# Patient Record
Sex: FEMALE | Race: WHITE
[De-identification: names, ages, dates, MRNs, and addresses within clinical notes are randomized per-mention and may not be internally consistent; named-entity substitution may affect disease eponyms.]

---

## 2017-11-17 ENCOUNTER — HOSPITAL ENCOUNTER (INPATIENT)
Dept: HOSPITAL 27 - B3A | Age: 32
LOS: 50 days | Discharge: HOME | DRG: 750 | End: 2018-01-06
Attending: PSYCHIATRY & NEUROLOGY | Admitting: PSYCHIATRY & NEUROLOGY
Payer: MEDICAID

## 2017-11-17 VITALS — HEIGHT: 62 IN | WEIGHT: 155 LBS | BODY MASS INDEX: 28.52 KG/M2

## 2017-11-17 VITALS — DIASTOLIC BLOOD PRESSURE: 76 MMHG | SYSTOLIC BLOOD PRESSURE: 120 MMHG

## 2017-11-17 VITALS — DIASTOLIC BLOOD PRESSURE: 83 MMHG | SYSTOLIC BLOOD PRESSURE: 122 MMHG

## 2017-11-17 DIAGNOSIS — F41.9: ICD-10-CM

## 2017-11-17 DIAGNOSIS — Z81.8: ICD-10-CM

## 2017-11-17 DIAGNOSIS — Z91.19: ICD-10-CM

## 2017-11-17 DIAGNOSIS — Z59.0: ICD-10-CM

## 2017-11-17 DIAGNOSIS — F25.0: Primary | ICD-10-CM

## 2017-11-17 DIAGNOSIS — Z88.8: ICD-10-CM

## 2017-11-17 DIAGNOSIS — L30.9: ICD-10-CM

## 2017-11-17 DIAGNOSIS — R45.4: ICD-10-CM

## 2017-11-17 DIAGNOSIS — Z91.14: ICD-10-CM

## 2017-11-17 DIAGNOSIS — Z79.899: ICD-10-CM

## 2017-11-17 PROCEDURE — 99285 EMERGENCY DEPT VISIT HI MDM: CPT

## 2017-11-17 RX ADMIN — HYDROCORTISONE SCH APPL: 1 OINTMENT TOPICAL at 18:00

## 2017-11-17 SDOH — ECONOMIC STABILITY - HOUSING INSECURITY: HOMELESSNESS: Z59.0

## 2017-11-18 VITALS — SYSTOLIC BLOOD PRESSURE: 121 MMHG | DIASTOLIC BLOOD PRESSURE: 63 MMHG

## 2017-11-18 VITALS — DIASTOLIC BLOOD PRESSURE: 70 MMHG | SYSTOLIC BLOOD PRESSURE: 120 MMHG

## 2017-11-18 RX ADMIN — HYDROCORTISONE SCH APPL: 1 OINTMENT TOPICAL at 17:09

## 2017-11-18 RX ADMIN — HYDROCORTISONE SCH APPL: 1 OINTMENT TOPICAL at 09:47

## 2017-11-19 RX ADMIN — HYDROCORTISONE SCH APPL: 1 OINTMENT TOPICAL at 16:26

## 2017-11-19 RX ADMIN — DIVALPROEX SODIUM SCH MG: 500 TABLET, DELAYED RELEASE ORAL at 17:00

## 2017-11-19 RX ADMIN — HYDROCORTISONE SCH APPL: 1 OINTMENT TOPICAL at 08:54

## 2017-11-20 RX ADMIN — HYDROCORTISONE SCH APPL: 1 OINTMENT TOPICAL at 16:45

## 2017-11-20 RX ADMIN — DIVALPROEX SODIUM SCH MG: 500 TABLET, DELAYED RELEASE ORAL at 09:00

## 2017-11-20 RX ADMIN — HYDROCORTISONE SCH APPL: 1 OINTMENT TOPICAL at 09:00

## 2017-11-20 RX ADMIN — DIVALPROEX SODIUM SCH MG: 500 TABLET, DELAYED RELEASE ORAL at 17:00

## 2017-11-21 RX ADMIN — LITHIUM CARBONATE SCH MG: 300 CAPSULE ORAL at 17:00

## 2017-11-21 RX ADMIN — DIVALPROEX SODIUM SCH MG: 500 TABLET, DELAYED RELEASE ORAL at 16:34

## 2017-11-21 RX ADMIN — HYDROCORTISONE SCH APPL: 1 OINTMENT TOPICAL at 08:11

## 2017-11-21 RX ADMIN — DIVALPROEX SODIUM SCH MG: 500 TABLET, DELAYED RELEASE ORAL at 08:05

## 2017-11-21 RX ADMIN — DIVALPROEX SODIUM SCH MG: 500 TABLET, DELAYED RELEASE ORAL at 08:10

## 2017-11-21 RX ADMIN — HYDROCORTISONE SCH APPL: 1 OINTMENT TOPICAL at 16:34

## 2017-11-22 RX ADMIN — LITHIUM CARBONATE SCH MG: 300 CAPSULE ORAL at 09:57

## 2017-11-22 RX ADMIN — HYDROCORTISONE SCH APPL: 1 OINTMENT TOPICAL at 09:57

## 2017-11-22 RX ADMIN — HYDROCORTISONE SCH APPL: 1 OINTMENT TOPICAL at 16:28

## 2017-11-22 RX ADMIN — LITHIUM CARBONATE SCH MG: 300 CAPSULE ORAL at 16:29

## 2017-11-22 RX ADMIN — LITHIUM CARBONATE SCH MG: 300 CAPSULE ORAL at 09:00

## 2017-11-23 RX ADMIN — HYDROCORTISONE SCH APPL: 1 OINTMENT TOPICAL at 16:19

## 2017-11-23 RX ADMIN — LITHIUM CARBONATE SCH MG: 300 CAPSULE ORAL at 08:17

## 2017-11-23 RX ADMIN — LITHIUM CARBONATE SCH MG: 300 CAPSULE ORAL at 17:00

## 2017-11-23 RX ADMIN — HYDROCORTISONE SCH APPL: 1 OINTMENT TOPICAL at 08:18

## 2017-11-24 RX ADMIN — HYDROCORTISONE SCH APPL: 1 OINTMENT TOPICAL at 16:04

## 2017-11-24 RX ADMIN — HYDROCORTISONE SCH APPL: 1 OINTMENT TOPICAL at 07:56

## 2017-11-24 RX ADMIN — LITHIUM CARBONATE SCH MG: 300 CAPSULE ORAL at 07:55

## 2017-11-24 RX ADMIN — LITHIUM CARBONATE SCH MG: 300 CAPSULE ORAL at 16:04

## 2017-11-25 RX ADMIN — LITHIUM CARBONATE SCH MG: 300 CAPSULE ORAL at 16:24

## 2017-11-25 RX ADMIN — LITHIUM CARBONATE SCH MG: 300 CAPSULE ORAL at 08:52

## 2017-11-25 RX ADMIN — HYDROCORTISONE SCH APPL: 1 OINTMENT TOPICAL at 16:25

## 2017-11-25 RX ADMIN — HYDROCORTISONE SCH APPL: 1 OINTMENT TOPICAL at 08:51

## 2017-11-26 RX ADMIN — HYDROCORTISONE SCH APPL: 1 OINTMENT TOPICAL at 17:20

## 2017-11-26 RX ADMIN — LITHIUM CARBONATE SCH MG: 300 CAPSULE ORAL at 08:29

## 2017-11-26 RX ADMIN — LITHIUM CARBONATE SCH MG: 300 CAPSULE ORAL at 17:00

## 2017-11-26 RX ADMIN — HYDROCORTISONE SCH APPL: 1 OINTMENT TOPICAL at 08:29

## 2017-11-27 RX ADMIN — HYDROCORTISONE SCH APPL: 1 OINTMENT TOPICAL at 08:05

## 2017-11-27 RX ADMIN — LITHIUM CARBONATE SCH MG: 300 CAPSULE ORAL at 08:06

## 2017-11-27 RX ADMIN — DIVALPROEX SODIUM SCH MG: 500 TABLET, DELAYED RELEASE ORAL at 16:27

## 2017-11-27 RX ADMIN — HYDROCORTISONE SCH APPL: 1 OINTMENT TOPICAL at 16:27

## 2017-11-27 RX ADMIN — DIVALPROEX SODIUM SCH MG: 500 TABLET, DELAYED RELEASE ORAL at 09:30

## 2017-11-28 RX ADMIN — DIVALPROEX SODIUM SCH MG: 500 TABLET, DELAYED RELEASE ORAL at 08:39

## 2017-11-28 RX ADMIN — HYDROCORTISONE SCH APPL: 1 OINTMENT TOPICAL at 16:03

## 2017-11-28 RX ADMIN — HYDROCORTISONE SCH APPL: 1 OINTMENT TOPICAL at 09:52

## 2017-11-28 RX ADMIN — DIVALPROEX SODIUM SCH MG: 500 TABLET, DELAYED RELEASE ORAL at 16:08

## 2017-11-29 RX ADMIN — DIVALPROEX SODIUM SCH MG: 500 TABLET, DELAYED RELEASE ORAL at 08:10

## 2017-11-29 RX ADMIN — DIVALPROEX SODIUM SCH MG: 500 TABLET, DELAYED RELEASE ORAL at 16:08

## 2017-11-29 RX ADMIN — HYDROCORTISONE SCH APPL: 1 OINTMENT TOPICAL at 08:09

## 2017-11-29 RX ADMIN — HYDROCORTISONE SCH APPL: 1 OINTMENT TOPICAL at 16:07

## 2017-11-30 RX ADMIN — HYDROCORTISONE SCH APPL: 1 OINTMENT TOPICAL at 16:03

## 2017-11-30 RX ADMIN — LITHIUM CARBONATE SCH MG: 300 CAPSULE ORAL at 16:02

## 2017-11-30 RX ADMIN — DIVALPROEX SODIUM SCH MG: 500 TABLET, DELAYED RELEASE ORAL at 08:35

## 2017-11-30 RX ADMIN — HYDROCORTISONE SCH APPL: 1 OINTMENT TOPICAL at 08:35

## 2017-12-01 RX ADMIN — LITHIUM CARBONATE SCH MG: 300 CAPSULE ORAL at 16:14

## 2017-12-01 RX ADMIN — LITHIUM CARBONATE SCH MG: 300 CAPSULE ORAL at 08:31

## 2017-12-01 RX ADMIN — HYDROCORTISONE SCH APPL: 1 OINTMENT TOPICAL at 17:12

## 2017-12-01 RX ADMIN — HYDROCORTISONE SCH APPL: 1 OINTMENT TOPICAL at 08:41

## 2017-12-02 RX ADMIN — LITHIUM CARBONATE SCH MG: 600 CAPSULE, GELATIN COATED ORAL at 09:08

## 2017-12-02 RX ADMIN — LITHIUM CARBONATE SCH MG: 600 CAPSULE, GELATIN COATED ORAL at 16:08

## 2017-12-02 RX ADMIN — HYDROCORTISONE SCH APPL: 1 OINTMENT TOPICAL at 16:08

## 2017-12-02 RX ADMIN — HYDROCORTISONE SCH APPL: 1 OINTMENT TOPICAL at 09:55

## 2017-12-03 RX ADMIN — LITHIUM CARBONATE SCH MG: 600 CAPSULE, GELATIN COATED ORAL at 09:22

## 2017-12-03 RX ADMIN — HYDROCORTISONE SCH APPL: 1 OINTMENT TOPICAL at 16:16

## 2017-12-03 RX ADMIN — LITHIUM CARBONATE SCH MG: 600 CAPSULE, GELATIN COATED ORAL at 16:15

## 2017-12-03 RX ADMIN — HYDROCORTISONE SCH APPL: 1 OINTMENT TOPICAL at 09:00

## 2017-12-03 RX ADMIN — CARBAMIDE PEROXIDE 6.5% SCH DROP: 6.5 LIQUID AURICULAR (OTIC) at 16:15

## 2017-12-03 RX ADMIN — CARBAMIDE PEROXIDE 6.5% SCH DROP: 6.5 LIQUID AURICULAR (OTIC) at 10:30

## 2017-12-04 RX ADMIN — CARBAMIDE PEROXIDE 6.5% SCH DROP: 6.5 LIQUID AURICULAR (OTIC) at 08:35

## 2017-12-04 RX ADMIN — HYDROCORTISONE SCH APPL: 1 OINTMENT TOPICAL at 08:35

## 2017-12-04 RX ADMIN — HYDROCORTISONE SCH APPL: 1 OINTMENT TOPICAL at 17:06

## 2017-12-04 RX ADMIN — CARBAMIDE PEROXIDE 6.5% SCH DROP: 6.5 LIQUID AURICULAR (OTIC) at 17:06

## 2017-12-04 RX ADMIN — LITHIUM CARBONATE SCH MG: 600 CAPSULE, GELATIN COATED ORAL at 08:35

## 2017-12-05 RX ADMIN — LITHIUM CARBONATE SCH MG: 450 TABLET, EXTENDED RELEASE ORAL at 09:01

## 2017-12-05 RX ADMIN — HYDROCORTISONE SCH APPL: 1 OINTMENT TOPICAL at 09:01

## 2017-12-05 RX ADMIN — CARBAMIDE PEROXIDE 6.5% SCH DROP: 6.5 LIQUID AURICULAR (OTIC) at 17:12

## 2017-12-05 RX ADMIN — CARBAMIDE PEROXIDE 6.5% SCH DROP: 6.5 LIQUID AURICULAR (OTIC) at 09:03

## 2017-12-05 RX ADMIN — LITHIUM CARBONATE SCH MG: 450 TABLET, EXTENDED RELEASE ORAL at 17:11

## 2017-12-05 RX ADMIN — HYDROCORTISONE SCH APPL: 1 OINTMENT TOPICAL at 17:11

## 2017-12-05 RX ADMIN — LITHIUM CARBONATE SCH MG: 450 TABLET, EXTENDED RELEASE ORAL at 12:36

## 2017-12-06 RX ADMIN — CARBAMIDE PEROXIDE 6.5% SCH DROP: 6.5 LIQUID AURICULAR (OTIC) at 08:36

## 2017-12-06 RX ADMIN — HYDROCORTISONE SCH APPL: 1 OINTMENT TOPICAL at 08:36

## 2017-12-06 RX ADMIN — LITHIUM CARBONATE SCH MG: 450 TABLET, EXTENDED RELEASE ORAL at 16:04

## 2017-12-06 RX ADMIN — LITHIUM CARBONATE SCH MG: 450 TABLET, EXTENDED RELEASE ORAL at 08:36

## 2017-12-06 RX ADMIN — CARBAMIDE PEROXIDE 6.5% SCH DROP: 6.5 LIQUID AURICULAR (OTIC) at 16:03

## 2017-12-06 RX ADMIN — LITHIUM CARBONATE SCH MG: 450 TABLET, EXTENDED RELEASE ORAL at 12:14

## 2017-12-06 RX ADMIN — HYDROCORTISONE SCH APPL: 1 OINTMENT TOPICAL at 16:03

## 2017-12-07 RX ADMIN — LITHIUM CARBONATE SCH MG: 450 TABLET, EXTENDED RELEASE ORAL at 08:20

## 2017-12-07 RX ADMIN — CARBAMIDE PEROXIDE 6.5% SCH DROP: 6.5 LIQUID AURICULAR (OTIC) at 16:07

## 2017-12-07 RX ADMIN — LITHIUM CARBONATE SCH MG: 450 TABLET, EXTENDED RELEASE ORAL at 12:31

## 2017-12-07 RX ADMIN — HYDROCORTISONE SCH APPL: 1 OINTMENT TOPICAL at 16:07

## 2017-12-07 RX ADMIN — LITHIUM CARBONATE SCH MG: 450 TABLET, EXTENDED RELEASE ORAL at 16:06

## 2017-12-07 RX ADMIN — HYDROCORTISONE SCH APPL: 1 OINTMENT TOPICAL at 08:20

## 2017-12-07 RX ADMIN — CARBAMIDE PEROXIDE 6.5% SCH DROP: 6.5 LIQUID AURICULAR (OTIC) at 08:20

## 2017-12-08 RX ADMIN — LITHIUM CARBONATE SCH MG: 450 TABLET, EXTENDED RELEASE ORAL at 16:01

## 2017-12-08 RX ADMIN — CARBAMIDE PEROXIDE 6.5% SCH DROP: 6.5 LIQUID AURICULAR (OTIC) at 16:02

## 2017-12-08 RX ADMIN — HYDROCORTISONE SCH APPL: 1 OINTMENT TOPICAL at 08:45

## 2017-12-08 RX ADMIN — HYDROCORTISONE SCH APPL: 1 OINTMENT TOPICAL at 16:02

## 2017-12-08 RX ADMIN — CARBAMIDE PEROXIDE 6.5% SCH DROP: 6.5 LIQUID AURICULAR (OTIC) at 08:45

## 2017-12-08 RX ADMIN — LITHIUM CARBONATE SCH MG: 450 TABLET, EXTENDED RELEASE ORAL at 08:45

## 2017-12-09 RX ADMIN — LITHIUM CARBONATE SCH MG: 450 TABLET, EXTENDED RELEASE ORAL at 08:27

## 2017-12-09 RX ADMIN — CARBAMIDE PEROXIDE 6.5% SCH DROP: 6.5 LIQUID AURICULAR (OTIC) at 16:50

## 2017-12-09 RX ADMIN — CARBAMIDE PEROXIDE 6.5% SCH DROP: 6.5 LIQUID AURICULAR (OTIC) at 08:27

## 2017-12-09 RX ADMIN — HYDROCORTISONE SCH APPL: 1 OINTMENT TOPICAL at 16:49

## 2017-12-09 RX ADMIN — LITHIUM CARBONATE SCH MG: 450 TABLET, EXTENDED RELEASE ORAL at 16:50

## 2017-12-09 RX ADMIN — LITHIUM CARBONATE SCH MG: 450 TABLET, EXTENDED RELEASE ORAL at 12:19

## 2017-12-09 RX ADMIN — HYDROCORTISONE SCH APPL: 1 OINTMENT TOPICAL at 08:28

## 2017-12-10 VITALS — SYSTOLIC BLOOD PRESSURE: 103 MMHG | DIASTOLIC BLOOD PRESSURE: 69 MMHG

## 2017-12-10 RX ADMIN — HYDROCORTISONE SCH APPL: 1 OINTMENT TOPICAL at 16:07

## 2017-12-10 RX ADMIN — LITHIUM CARBONATE SCH MG: 450 TABLET, EXTENDED RELEASE ORAL at 12:42

## 2017-12-10 RX ADMIN — CARBAMIDE PEROXIDE 6.5% SCH DROP: 6.5 LIQUID AURICULAR (OTIC) at 16:07

## 2017-12-10 RX ADMIN — CARBAMIDE PEROXIDE 6.5% SCH DROP: 6.5 LIQUID AURICULAR (OTIC) at 08:44

## 2017-12-10 RX ADMIN — LITHIUM CARBONATE SCH MG: 450 TABLET, EXTENDED RELEASE ORAL at 08:44

## 2017-12-10 RX ADMIN — HYDROCORTISONE SCH APPL: 1 OINTMENT TOPICAL at 08:44

## 2017-12-10 RX ADMIN — LITHIUM CARBONATE SCH MG: 450 TABLET, EXTENDED RELEASE ORAL at 16:07

## 2017-12-11 RX ADMIN — LITHIUM CARBONATE SCH MG: 450 TABLET, EXTENDED RELEASE ORAL at 08:15

## 2017-12-11 RX ADMIN — LITHIUM CARBONATE SCH MG: 450 TABLET, EXTENDED RELEASE ORAL at 16:37

## 2017-12-11 RX ADMIN — LITHIUM CARBONATE SCH MG: 450 TABLET, EXTENDED RELEASE ORAL at 13:22

## 2017-12-11 RX ADMIN — CARBAMIDE PEROXIDE 6.5% SCH DROP: 6.5 LIQUID AURICULAR (OTIC) at 16:37

## 2017-12-11 RX ADMIN — HYDROCORTISONE SCH APPL: 1 OINTMENT TOPICAL at 08:16

## 2017-12-11 RX ADMIN — CARBAMIDE PEROXIDE 6.5% SCH DROP: 6.5 LIQUID AURICULAR (OTIC) at 08:15

## 2017-12-11 RX ADMIN — HYDROCORTISONE SCH APPL: 1 OINTMENT TOPICAL at 16:36

## 2017-12-12 RX ADMIN — LITHIUM CARBONATE SCH MG: 450 TABLET, EXTENDED RELEASE ORAL at 16:53

## 2017-12-12 RX ADMIN — HYDROCORTISONE SCH APPL: 1 OINTMENT TOPICAL at 08:56

## 2017-12-12 RX ADMIN — LITHIUM CARBONATE SCH MG: 450 TABLET, EXTENDED RELEASE ORAL at 08:56

## 2017-12-12 RX ADMIN — HYDROCORTISONE SCH APPL: 1 OINTMENT TOPICAL at 16:53

## 2017-12-12 RX ADMIN — CARBAMIDE PEROXIDE 6.5% SCH DROP: 6.5 LIQUID AURICULAR (OTIC) at 16:53

## 2017-12-12 RX ADMIN — LITHIUM CARBONATE SCH MG: 450 TABLET, EXTENDED RELEASE ORAL at 12:05

## 2017-12-12 RX ADMIN — CARBAMIDE PEROXIDE 6.5% SCH DROP: 6.5 LIQUID AURICULAR (OTIC) at 08:56

## 2017-12-13 RX ADMIN — HYDROCORTISONE SCH APPL: 1 OINTMENT TOPICAL at 09:15

## 2017-12-13 RX ADMIN — CARBAMIDE PEROXIDE 6.5% SCH DROP: 6.5 LIQUID AURICULAR (OTIC) at 09:15

## 2017-12-13 RX ADMIN — LITHIUM CARBONATE SCH MG: 450 TABLET, EXTENDED RELEASE ORAL at 09:15

## 2017-12-13 RX ADMIN — LITHIUM CARBONATE SCH MG: 450 TABLET, EXTENDED RELEASE ORAL at 12:20

## 2017-12-13 RX ADMIN — CARBAMIDE PEROXIDE 6.5% SCH DROP: 6.5 LIQUID AURICULAR (OTIC) at 17:30

## 2017-12-13 RX ADMIN — LITHIUM CARBONATE SCH MG: 450 TABLET, EXTENDED RELEASE ORAL at 17:29

## 2017-12-13 RX ADMIN — HYDROCORTISONE SCH APPL: 1 OINTMENT TOPICAL at 17:31

## 2017-12-14 RX ADMIN — CARBAMIDE PEROXIDE 6.5% SCH DROP: 6.5 LIQUID AURICULAR (OTIC) at 16:14

## 2017-12-14 RX ADMIN — CARBAMIDE PEROXIDE 6.5% SCH DROP: 6.5 LIQUID AURICULAR (OTIC) at 08:58

## 2017-12-14 RX ADMIN — HYDROCORTISONE SCH APPL: 1 OINTMENT TOPICAL at 08:58

## 2017-12-14 RX ADMIN — PALIPERIDONE SCH MG: 3 TABLET, EXTENDED RELEASE ORAL at 16:14

## 2017-12-14 RX ADMIN — LITHIUM CARBONATE SCH MG: 450 TABLET, EXTENDED RELEASE ORAL at 08:57

## 2017-12-14 RX ADMIN — LITHIUM CARBONATE SCH MG: 450 TABLET, EXTENDED RELEASE ORAL at 12:32

## 2017-12-14 RX ADMIN — HYDROCORTISONE SCH APPL: 1 OINTMENT TOPICAL at 16:14

## 2017-12-15 RX ADMIN — PALIPERIDONE SCH MG: 3 TABLET, EXTENDED RELEASE ORAL at 13:10

## 2017-12-15 RX ADMIN — HYDROCORTISONE SCH APPL: 1 OINTMENT TOPICAL at 17:21

## 2017-12-15 RX ADMIN — PALIPERIDONE SCH MG: 6 TABLET, EXTENDED RELEASE ORAL at 17:21

## 2017-12-15 RX ADMIN — CARBAMIDE PEROXIDE 6.5% SCH DROP: 6.5 LIQUID AURICULAR (OTIC) at 17:21

## 2017-12-15 RX ADMIN — CARBAMIDE PEROXIDE 6.5% SCH DROP: 6.5 LIQUID AURICULAR (OTIC) at 09:00

## 2017-12-15 RX ADMIN — HYDROCORTISONE SCH APPL: 1 OINTMENT TOPICAL at 09:00

## 2017-12-15 RX ADMIN — PALIPERIDONE SCH MG: 3 TABLET, EXTENDED RELEASE ORAL at 09:00

## 2017-12-16 RX ADMIN — HYDROCORTISONE SCH APPL: 1 OINTMENT TOPICAL at 16:29

## 2017-12-16 RX ADMIN — HYDROCORTISONE SCH APPL: 1 OINTMENT TOPICAL at 08:13

## 2017-12-16 RX ADMIN — PALIPERIDONE SCH MG: 6 TABLET, EXTENDED RELEASE ORAL at 08:12

## 2017-12-16 RX ADMIN — CARBAMIDE PEROXIDE 6.5% SCH DROP: 6.5 LIQUID AURICULAR (OTIC) at 16:29

## 2017-12-16 RX ADMIN — CARBAMIDE PEROXIDE 6.5% SCH DROP: 6.5 LIQUID AURICULAR (OTIC) at 08:12

## 2017-12-16 RX ADMIN — PALIPERIDONE SCH MG: 6 TABLET, EXTENDED RELEASE ORAL at 16:29

## 2017-12-17 RX ADMIN — HYDROCORTISONE SCH APPL: 1 OINTMENT TOPICAL at 17:13

## 2017-12-17 RX ADMIN — PALIPERIDONE SCH MG: 6 TABLET, EXTENDED RELEASE ORAL at 17:11

## 2017-12-17 RX ADMIN — HYDROCORTISONE SCH APPL: 1 OINTMENT TOPICAL at 08:21

## 2017-12-17 RX ADMIN — CARBAMIDE PEROXIDE 6.5% SCH DROP: 6.5 LIQUID AURICULAR (OTIC) at 17:12

## 2017-12-17 RX ADMIN — CARBAMIDE PEROXIDE 6.5% SCH DROP: 6.5 LIQUID AURICULAR (OTIC) at 08:21

## 2017-12-17 RX ADMIN — PALIPERIDONE SCH MG: 6 TABLET, EXTENDED RELEASE ORAL at 08:21

## 2017-12-18 RX ADMIN — CARBAMIDE PEROXIDE 6.5% SCH DROP: 6.5 LIQUID AURICULAR (OTIC) at 09:22

## 2017-12-18 RX ADMIN — HYDROCORTISONE SCH APPL: 1 OINTMENT TOPICAL at 09:22

## 2017-12-18 RX ADMIN — PALIPERIDONE SCH MG: 6 TABLET, EXTENDED RELEASE ORAL at 09:22

## 2017-12-18 RX ADMIN — CARBAMIDE PEROXIDE 6.5% SCH DROP: 6.5 LIQUID AURICULAR (OTIC) at 17:03

## 2017-12-18 RX ADMIN — HYDROCORTISONE SCH APPL: 1 OINTMENT TOPICAL at 17:04

## 2017-12-18 RX ADMIN — PALIPERIDONE SCH MG: 6 TABLET, EXTENDED RELEASE ORAL at 17:04

## 2017-12-19 RX ADMIN — PALIPERIDONE SCH MG: 6 TABLET, EXTENDED RELEASE ORAL at 17:17

## 2017-12-19 RX ADMIN — HYDROCORTISONE SCH APPL: 1 OINTMENT TOPICAL at 17:19

## 2017-12-19 RX ADMIN — CARBAMIDE PEROXIDE 6.5% SCH DROP: 6.5 LIQUID AURICULAR (OTIC) at 09:02

## 2017-12-19 RX ADMIN — HYDROCORTISONE SCH APPL: 1 OINTMENT TOPICAL at 09:02

## 2017-12-19 RX ADMIN — PALIPERIDONE SCH MG: 6 TABLET, EXTENDED RELEASE ORAL at 09:02

## 2017-12-19 RX ADMIN — CARBAMIDE PEROXIDE 6.5% SCH DROP: 6.5 LIQUID AURICULAR (OTIC) at 17:22

## 2017-12-20 RX ADMIN — HYDROCORTISONE SCH APPL: 1 OINTMENT TOPICAL at 08:22

## 2017-12-20 RX ADMIN — CARBAMIDE PEROXIDE 6.5% SCH DROP: 6.5 LIQUID AURICULAR (OTIC) at 08:22

## 2017-12-20 RX ADMIN — PALIPERIDONE SCH MG: 6 TABLET, EXTENDED RELEASE ORAL at 08:21

## 2017-12-20 RX ADMIN — CARBAMIDE PEROXIDE 6.5% SCH DROP: 6.5 LIQUID AURICULAR (OTIC) at 16:46

## 2017-12-20 RX ADMIN — PALIPERIDONE SCH MG: 6 TABLET, EXTENDED RELEASE ORAL at 16:45

## 2017-12-20 RX ADMIN — HYDROCORTISONE SCH APPL: 1 OINTMENT TOPICAL at 16:47

## 2017-12-21 RX ADMIN — CARBAMIDE PEROXIDE 6.5% SCH DROP: 6.5 LIQUID AURICULAR (OTIC) at 16:16

## 2017-12-21 RX ADMIN — HYDROCORTISONE SCH APPL: 1 OINTMENT TOPICAL at 16:16

## 2017-12-21 RX ADMIN — HYDROCORTISONE SCH APPL: 1 OINTMENT TOPICAL at 09:28

## 2017-12-21 RX ADMIN — CARBAMIDE PEROXIDE 6.5% SCH DROP: 6.5 LIQUID AURICULAR (OTIC) at 09:25

## 2017-12-21 RX ADMIN — PALIPERIDONE SCH MG: 6 TABLET, EXTENDED RELEASE ORAL at 16:15

## 2017-12-21 RX ADMIN — PALIPERIDONE SCH MG: 6 TABLET, EXTENDED RELEASE ORAL at 10:20

## 2017-12-22 RX ADMIN — CARBAMIDE PEROXIDE 6.5% SCH DROP: 6.5 LIQUID AURICULAR (OTIC) at 16:13

## 2017-12-22 RX ADMIN — PALIPERIDONE SCH MG: 6 TABLET, EXTENDED RELEASE ORAL at 08:12

## 2017-12-22 RX ADMIN — CARBAMIDE PEROXIDE 6.5% SCH DROP: 6.5 LIQUID AURICULAR (OTIC) at 08:12

## 2017-12-22 RX ADMIN — PALIPERIDONE SCH MG: 6 TABLET, EXTENDED RELEASE ORAL at 16:11

## 2017-12-22 RX ADMIN — HYDROCORTISONE SCH APPL: 1 OINTMENT TOPICAL at 16:12

## 2017-12-22 RX ADMIN — HYDROCORTISONE SCH APPL: 1 OINTMENT TOPICAL at 08:13

## 2017-12-23 RX ADMIN — HYDROCORTISONE SCH APPL: 1 OINTMENT TOPICAL at 16:01

## 2017-12-23 RX ADMIN — HYDROCORTISONE SCH APPL: 1 OINTMENT TOPICAL at 08:06

## 2017-12-23 RX ADMIN — PALIPERIDONE SCH MG: 6 TABLET, EXTENDED RELEASE ORAL at 16:00

## 2017-12-23 RX ADMIN — PALIPERIDONE SCH MG: 6 TABLET, EXTENDED RELEASE ORAL at 08:08

## 2017-12-23 RX ADMIN — CARBAMIDE PEROXIDE 6.5% SCH DROP: 6.5 LIQUID AURICULAR (OTIC) at 16:01

## 2017-12-23 RX ADMIN — CARBAMIDE PEROXIDE 6.5% SCH DROP: 6.5 LIQUID AURICULAR (OTIC) at 08:08

## 2017-12-24 RX ADMIN — HYDROCORTISONE SCH APPL: 1 OINTMENT TOPICAL at 16:09

## 2017-12-24 RX ADMIN — PALIPERIDONE SCH MG: 6 TABLET, EXTENDED RELEASE ORAL at 16:08

## 2017-12-24 RX ADMIN — CARBAMIDE PEROXIDE 6.5% SCH DROP: 6.5 LIQUID AURICULAR (OTIC) at 08:20

## 2017-12-24 RX ADMIN — PALIPERIDONE SCH MG: 6 TABLET, EXTENDED RELEASE ORAL at 08:21

## 2017-12-24 RX ADMIN — CARBAMIDE PEROXIDE 6.5% SCH DROP: 6.5 LIQUID AURICULAR (OTIC) at 16:09

## 2017-12-24 RX ADMIN — HYDROCORTISONE SCH APPL: 1 OINTMENT TOPICAL at 08:20

## 2017-12-25 RX ADMIN — CARBAMIDE PEROXIDE 6.5% SCH DROP: 6.5 LIQUID AURICULAR (OTIC) at 09:00

## 2017-12-25 RX ADMIN — PALIPERIDONE SCH MG: 6 TABLET, EXTENDED RELEASE ORAL at 16:03

## 2017-12-25 RX ADMIN — PALIPERIDONE SCH MG: 6 TABLET, EXTENDED RELEASE ORAL at 09:00

## 2017-12-25 RX ADMIN — HYDROCORTISONE SCH APPL: 1 OINTMENT TOPICAL at 16:02

## 2017-12-25 RX ADMIN — CARBAMIDE PEROXIDE 6.5% SCH DROP: 6.5 LIQUID AURICULAR (OTIC) at 16:03

## 2017-12-25 RX ADMIN — HYDROCORTISONE SCH APPL: 1 OINTMENT TOPICAL at 09:00

## 2017-12-26 RX ADMIN — PALIPERIDONE SCH MG: 6 TABLET, EXTENDED RELEASE ORAL at 08:51

## 2017-12-26 RX ADMIN — HYDROCORTISONE SCH APPL: 1 OINTMENT TOPICAL at 08:59

## 2017-12-26 RX ADMIN — HYDROCORTISONE SCH APPL: 1 OINTMENT TOPICAL at 16:16

## 2017-12-26 RX ADMIN — CARBAMIDE PEROXIDE 6.5% SCH DROP: 6.5 LIQUID AURICULAR (OTIC) at 08:59

## 2017-12-26 RX ADMIN — CARBAMIDE PEROXIDE 6.5% SCH DROP: 6.5 LIQUID AURICULAR (OTIC) at 16:16

## 2017-12-26 RX ADMIN — PALIPERIDONE SCH MG: 6 TABLET, EXTENDED RELEASE ORAL at 16:16

## 2017-12-27 RX ADMIN — PALIPERIDONE SCH MG: 6 TABLET, EXTENDED RELEASE ORAL at 08:14

## 2017-12-27 RX ADMIN — PALIPERIDONE SCH MG: 6 TABLET, EXTENDED RELEASE ORAL at 16:50

## 2017-12-27 RX ADMIN — HYDROCORTISONE SCH APPL: 1 OINTMENT TOPICAL at 16:53

## 2017-12-27 RX ADMIN — CARBAMIDE PEROXIDE 6.5% SCH DROP: 6.5 LIQUID AURICULAR (OTIC) at 08:14

## 2017-12-27 RX ADMIN — HYDROCORTISONE SCH APPL: 1 OINTMENT TOPICAL at 08:14

## 2017-12-27 RX ADMIN — CARBAMIDE PEROXIDE 6.5% SCH DROP: 6.5 LIQUID AURICULAR (OTIC) at 16:52

## 2017-12-28 RX ADMIN — CARBAMIDE PEROXIDE 6.5% SCH DROP: 6.5 LIQUID AURICULAR (OTIC) at 16:10

## 2017-12-28 RX ADMIN — CARBAMIDE PEROXIDE 6.5% SCH DROP: 6.5 LIQUID AURICULAR (OTIC) at 07:52

## 2017-12-28 RX ADMIN — HYDROCORTISONE SCH APPL: 1 OINTMENT TOPICAL at 07:52

## 2017-12-28 RX ADMIN — PALIPERIDONE SCH MG: 6 TABLET, EXTENDED RELEASE ORAL at 07:52

## 2017-12-28 RX ADMIN — HYDROCORTISONE SCH APPL: 1 OINTMENT TOPICAL at 16:10

## 2017-12-28 RX ADMIN — PALIPERIDONE SCH MG: 6 TABLET, EXTENDED RELEASE ORAL at 16:10

## 2017-12-29 RX ADMIN — PALIPERIDONE SCH MG: 6 TABLET, EXTENDED RELEASE ORAL at 08:47

## 2017-12-29 RX ADMIN — PALIPERIDONE SCH MG: 6 TABLET, EXTENDED RELEASE ORAL at 16:50

## 2017-12-29 RX ADMIN — HYDROCORTISONE SCH APPL: 1 OINTMENT TOPICAL at 08:48

## 2017-12-29 RX ADMIN — CARBAMIDE PEROXIDE 6.5% SCH DROP: 6.5 LIQUID AURICULAR (OTIC) at 16:50

## 2017-12-29 RX ADMIN — CARBAMIDE PEROXIDE 6.5% SCH DROP: 6.5 LIQUID AURICULAR (OTIC) at 08:48

## 2017-12-29 RX ADMIN — HYDROCORTISONE SCH APPL: 1 OINTMENT TOPICAL at 16:50

## 2017-12-30 RX ADMIN — HYDROCORTISONE SCH APPL: 1 OINTMENT TOPICAL at 08:36

## 2017-12-30 RX ADMIN — CARBAMIDE PEROXIDE 6.5% SCH DROP: 6.5 LIQUID AURICULAR (OTIC) at 16:27

## 2017-12-30 RX ADMIN — CARBAMIDE PEROXIDE 6.5% SCH DROP: 6.5 LIQUID AURICULAR (OTIC) at 08:36

## 2017-12-30 RX ADMIN — HYDROCORTISONE SCH APPL: 1 OINTMENT TOPICAL at 16:27

## 2017-12-30 RX ADMIN — PALIPERIDONE SCH MG: 6 TABLET, EXTENDED RELEASE ORAL at 08:37

## 2017-12-30 RX ADMIN — PALIPERIDONE SCH MG: 6 TABLET, EXTENDED RELEASE ORAL at 16:26

## 2017-12-31 RX ADMIN — HYDROCORTISONE SCH APPL: 1 OINTMENT TOPICAL at 07:59

## 2017-12-31 RX ADMIN — HYDROCORTISONE SCH APPL: 1 OINTMENT TOPICAL at 16:24

## 2017-12-31 RX ADMIN — PALIPERIDONE SCH MG: 6 TABLET, EXTENDED RELEASE ORAL at 16:23

## 2017-12-31 RX ADMIN — CARBAMIDE PEROXIDE 6.5% SCH DROP: 6.5 LIQUID AURICULAR (OTIC) at 16:24

## 2017-12-31 RX ADMIN — CARBAMIDE PEROXIDE 6.5% SCH DROP: 6.5 LIQUID AURICULAR (OTIC) at 07:59

## 2017-12-31 RX ADMIN — PALIPERIDONE SCH MG: 6 TABLET, EXTENDED RELEASE ORAL at 07:59

## 2018-01-01 RX ADMIN — CARBAMIDE PEROXIDE 6.5% SCH DROP: 6.5 LIQUID AURICULAR (OTIC) at 16:39

## 2018-01-01 RX ADMIN — CARBAMIDE PEROXIDE 6.5% SCH DROP: 6.5 LIQUID AURICULAR (OTIC) at 09:26

## 2018-01-01 RX ADMIN — PALIPERIDONE SCH MG: 6 TABLET, EXTENDED RELEASE ORAL at 08:01

## 2018-01-01 RX ADMIN — HYDROCORTISONE SCH APPL: 1 OINTMENT TOPICAL at 09:25

## 2018-01-01 RX ADMIN — HYDROCORTISONE SCH APPL: 1 OINTMENT TOPICAL at 16:40

## 2018-01-01 RX ADMIN — PALIPERIDONE SCH MG: 6 TABLET, EXTENDED RELEASE ORAL at 16:40

## 2018-01-02 RX ADMIN — HYDROCORTISONE SCH APPL: 1 OINTMENT TOPICAL at 09:06

## 2018-01-02 RX ADMIN — CARBAMIDE PEROXIDE 6.5% SCH DROP: 6.5 LIQUID AURICULAR (OTIC) at 09:06

## 2018-01-02 RX ADMIN — HYDROCORTISONE SCH APPL: 1 OINTMENT TOPICAL at 17:14

## 2018-01-02 RX ADMIN — PALIPERIDONE SCH MG: 6 TABLET, EXTENDED RELEASE ORAL at 17:13

## 2018-01-02 RX ADMIN — PALIPERIDONE SCH MG: 6 TABLET, EXTENDED RELEASE ORAL at 09:05

## 2018-01-02 RX ADMIN — CARBAMIDE PEROXIDE 6.5% SCH DROP: 6.5 LIQUID AURICULAR (OTIC) at 17:14

## 2018-01-03 RX ADMIN — CARBAMIDE PEROXIDE 6.5% SCH DROP: 6.5 LIQUID AURICULAR (OTIC) at 08:21

## 2018-01-03 RX ADMIN — PALIPERIDONE SCH MG: 6 TABLET, EXTENDED RELEASE ORAL at 07:51

## 2018-01-03 RX ADMIN — PALIPERIDONE PALMITATE SCH MG: 234 INJECTION INTRAMUSCULAR at 07:51

## 2018-01-03 RX ADMIN — HYDROCORTISONE SCH APPL: 1 OINTMENT TOPICAL at 08:22

## 2018-01-03 RX ADMIN — CARBAMIDE PEROXIDE 6.5% SCH DROP: 6.5 LIQUID AURICULAR (OTIC) at 17:08

## 2018-01-03 RX ADMIN — HYDROCORTISONE SCH APPL: 1 OINTMENT TOPICAL at 17:09

## 2018-01-03 RX ADMIN — PALIPERIDONE PALMITATE SCH MG: 234 INJECTION INTRAMUSCULAR at 09:00

## 2018-01-03 RX ADMIN — PALIPERIDONE SCH MG: 6 TABLET, EXTENDED RELEASE ORAL at 17:09

## 2018-01-04 RX ADMIN — HYDROCORTISONE SCH APPL: 1 OINTMENT TOPICAL at 16:10

## 2018-01-04 RX ADMIN — CARBAMIDE PEROXIDE 6.5% SCH DROP: 6.5 LIQUID AURICULAR (OTIC) at 08:11

## 2018-01-04 RX ADMIN — PALIPERIDONE SCH MG: 6 TABLET, EXTENDED RELEASE ORAL at 08:12

## 2018-01-04 RX ADMIN — CARBAMIDE PEROXIDE 6.5% SCH DROP: 6.5 LIQUID AURICULAR (OTIC) at 16:12

## 2018-01-04 RX ADMIN — PALIPERIDONE SCH MG: 6 TABLET, EXTENDED RELEASE ORAL at 16:12

## 2018-01-04 RX ADMIN — HYDROCORTISONE SCH APPL: 1 OINTMENT TOPICAL at 08:11

## 2018-01-05 RX ADMIN — PALIPERIDONE SCH MG: 6 TABLET, EXTENDED RELEASE ORAL at 08:52

## 2018-01-05 RX ADMIN — HYDROCORTISONE SCH APPL: 1 OINTMENT TOPICAL at 16:05

## 2018-01-05 RX ADMIN — CARBAMIDE PEROXIDE 6.5% SCH DROP: 6.5 LIQUID AURICULAR (OTIC) at 08:52

## 2018-01-05 RX ADMIN — PALIPERIDONE SCH MG: 6 TABLET, EXTENDED RELEASE ORAL at 16:03

## 2018-01-05 RX ADMIN — HYDROCORTISONE SCH APPL: 1 OINTMENT TOPICAL at 08:52

## 2018-01-06 RX ADMIN — PALIPERIDONE SCH MG: 6 TABLET, EXTENDED RELEASE ORAL at 08:12

## 2018-01-06 RX ADMIN — HYDROCORTISONE SCH APPL: 1 OINTMENT TOPICAL at 08:12

## 2019-02-25 ENCOUNTER — HOSPITAL ENCOUNTER (EMERGENCY)
Dept: HOSPITAL 26 - MED | Age: 34
Discharge: HOME | End: 2019-02-25
Payer: MEDICAID

## 2019-02-25 VITALS — DIASTOLIC BLOOD PRESSURE: 64 MMHG | SYSTOLIC BLOOD PRESSURE: 124 MMHG

## 2019-02-25 VITALS — WEIGHT: 120 LBS | HEIGHT: 69 IN | BODY MASS INDEX: 17.77 KG/M2

## 2019-02-25 VITALS — DIASTOLIC BLOOD PRESSURE: 85 MMHG | SYSTOLIC BLOOD PRESSURE: 119 MMHG

## 2019-02-25 DIAGNOSIS — Z79.899: ICD-10-CM

## 2019-02-25 DIAGNOSIS — M79.604: Primary | ICD-10-CM
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
## 2019-02-25 DIAGNOSIS — M79.605: ICD-10-CM
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
